# Patient Record
Sex: FEMALE | Race: WHITE | NOT HISPANIC OR LATINO | URBAN - METROPOLITAN AREA
[De-identification: names, ages, dates, MRNs, and addresses within clinical notes are randomized per-mention and may not be internally consistent; named-entity substitution may affect disease eponyms.]

---

## 2017-12-06 ENCOUNTER — OUTPATIENT (OUTPATIENT)
Dept: OUTPATIENT SERVICES | Facility: HOSPITAL | Age: 67
LOS: 1 days | End: 2017-12-06
Payer: COMMERCIAL

## 2017-12-06 ENCOUNTER — APPOINTMENT (OUTPATIENT)
Dept: MAMMOGRAPHY | Facility: CLINIC | Age: 67
End: 2017-12-06
Payer: COMMERCIAL

## 2017-12-06 DIAGNOSIS — Z00.8 ENCOUNTER FOR OTHER GENERAL EXAMINATION: ICD-10-CM

## 2017-12-06 PROCEDURE — G0202: CPT | Mod: 26

## 2017-12-06 PROCEDURE — 77067 SCR MAMMO BI INCL CAD: CPT

## 2017-12-06 PROCEDURE — 77063 BREAST TOMOSYNTHESIS BI: CPT

## 2017-12-06 PROCEDURE — 77063 BREAST TOMOSYNTHESIS BI: CPT | Mod: 26

## 2017-12-20 ENCOUNTER — APPOINTMENT (OUTPATIENT)
Dept: ULTRASOUND IMAGING | Facility: CLINIC | Age: 67
End: 2017-12-20

## 2017-12-20 ENCOUNTER — OUTPATIENT (OUTPATIENT)
Dept: OUTPATIENT SERVICES | Facility: HOSPITAL | Age: 67
LOS: 1 days | End: 2017-12-20
Payer: COMMERCIAL

## 2017-12-20 DIAGNOSIS — Z00.8 ENCOUNTER FOR OTHER GENERAL EXAMINATION: ICD-10-CM

## 2017-12-20 PROCEDURE — 76641 ULTRASOUND BREAST COMPLETE: CPT

## 2017-12-20 PROCEDURE — 76641 ULTRASOUND BREAST COMPLETE: CPT | Mod: 26,50

## 2018-06-20 ENCOUNTER — EMERGENCY (EMERGENCY)
Facility: HOSPITAL | Age: 68
LOS: 0 days | Discharge: HOME | End: 2018-06-20
Attending: EMERGENCY MEDICINE | Admitting: EMERGENCY MEDICINE
Payer: COMMERCIAL

## 2018-06-20 VITALS
HEART RATE: 96 BPM | TEMPERATURE: 98 F | DIASTOLIC BLOOD PRESSURE: 97 MMHG | OXYGEN SATURATION: 100 % | RESPIRATION RATE: 16 BRPM | SYSTOLIC BLOOD PRESSURE: 193 MMHG

## 2018-06-20 VITALS — WEIGHT: 82.89 LBS

## 2018-06-20 DIAGNOSIS — R51 HEADACHE: ICD-10-CM

## 2018-06-20 DIAGNOSIS — M79.605 PAIN IN LEFT LEG: ICD-10-CM

## 2018-06-20 DIAGNOSIS — S81.812A LACERATION WITHOUT FOREIGN BODY, LEFT LOWER LEG, INITIAL ENCOUNTER: ICD-10-CM
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
## 2018-06-20 DIAGNOSIS — S09.90XA UNSPECIFIED INJURY OF HEAD, INITIAL ENCOUNTER: ICD-10-CM
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
## 2018-06-20 DIAGNOSIS — S01.01XA LACERATION WITHOUT FOREIGN BODY OF SCALP, INITIAL ENCOUNTER: ICD-10-CM

## 2018-06-20 PROCEDURE — 70450 CT HEAD/BRAIN W/O DYE: CPT | Mod: 26

## 2018-06-20 PROCEDURE — 99284 EMERGENCY DEPT VISIT MOD MDM: CPT

## 2018-06-20 PROCEDURE — 12004 RPR S/N/AX/GEN/TRK7.6-12.5CM: CPT | Mod: 59,LT

## 2018-06-20 RX ORDER — CEPHALEXIN 500 MG
500 CAPSULE ORAL ONCE
Qty: 0 | Refills: 0 | Status: COMPLETED | OUTPATIENT
Start: 2018-06-20 | End: 2018-06-20

## 2018-06-20 RX ORDER — CEPHALEXIN 500 MG
4 CAPSULE ORAL
Qty: 112 | Refills: 0 | OUTPATIENT
Start: 2018-06-20 | End: 2018-06-26

## 2018-06-20 RX ORDER — ACETAMINOPHEN 500 MG
650 TABLET ORAL ONCE
Qty: 0 | Refills: 0 | Status: COMPLETED | OUTPATIENT
Start: 2018-06-20 | End: 2018-06-20

## 2018-06-20 RX ADMIN — Medication 500 MILLIGRAM(S): at 20:07

## 2018-06-20 RX ADMIN — Medication 650 MILLIGRAM(S): at 18:28

## 2018-06-20 NOTE — ED STATDOCS - PROGRESS NOTE DETAILS
69 yo female with a PMH of throat CA with G tube presents with left lower leg laceration s/p trip and fall against a brick wall. +head injury with area of bleeding. Denies LOC, dizziness, numbness, tingling, fever. -Anil Mckeon PA-C

## 2018-06-20 NOTE — ED PROCEDURE NOTE - PROCEDURE ADDITIONAL DETAILS
On superior aspect of the laceration: skin tear, unable to place stitches. Applied stereostripes. Applied bacitracin, sterile gauze, and kerlix.

## 2018-06-20 NOTE — ED STATDOCS - MEDICAL DECISION MAKING DETAILS
Elderly F ambulatory to ED with L anterior shin flap laceration and frontal scalp small laceration, +HA s/p trip and fall, hit leg and head against brick wall. No LOC. Neuro exam intact. No ASA nor a/c.  Plan: CT head, lac repairs of scalp and leg.

## 2018-06-20 NOTE — ED PROCEDURE NOTE - CPROC ED POST PROC CARE GUIDE1
Instructed patient/caregiver regarding signs and symptoms of infection./Verbal/written post procedure instructions were given to patient/caregiver./Instructed patient/caregiver to follow-up with primary care physician./Keep the cast/splint/dressing clean and dry.
Verbal/written post procedure instructions were given to patient/caregiver./Instructed patient/caregiver to follow-up with primary care physician./Keep the cast/splint/dressing clean and dry./Instructed patient/caregiver regarding signs and symptoms of infection.

## 2018-06-20 NOTE — ED STATDOCS - SECONDARY DIAGNOSIS.
Skin tear of left lower leg without complication, initial encounter Scalp laceration, initial encounter

## 2018-06-20 NOTE — ED STATDOCS - CARE PLAN
Principal Discharge DX:	Laceration of left lower leg, initial encounter  Secondary Diagnosis:	Skin tear of left lower leg without complication, initial encounter  Secondary Diagnosis:	Scalp laceration, initial encounter

## 2018-06-20 NOTE — ED ADULT TRIAGE NOTE - CHIEF COMPLAINT QUOTE
Pt presents to ED c/o "skin tear to my shin after tripping and falling on a brick wall". Pt has bandage applied with bleeding controlled upon arrival to ED. Pt denies blood thinners. Pt reports hitting head. Pt denies LOC. Pt GCS 15.

## 2018-06-20 NOTE — ED STATDOCS - ATTENDING CONTRIBUTION TO CARE
I, John Christensen MD, personally saw the patient with the PA, and completed the key components of the history and physical exam. I then discussed the management plan with the PA.

## 2018-06-20 NOTE — ED STATDOCS - OBJECTIVE STATEMENT
69 y/o female with a PMHx of throat CA (25 years ago) presents to the ED s/p mechanical fall at 4:30pm today. Pt tripped and fell onto a brick wall, striking her head and left shin. Pt now c/o HA, skin tear to left shin, laceration to scalp. No neck pain, weakness, numbness, LOC, nausea, blurry vision, neck pain. No blood thinners. Last tetanus shot within the last 10 years.

## 2019-01-25 PROBLEM — C14.0 MALIGNANT NEOPLASM OF PHARYNX, UNSPECIFIED: Chronic | Status: ACTIVE | Noted: 2018-06-25

## 2019-05-08 ENCOUNTER — APPOINTMENT (OUTPATIENT)
Dept: ULTRASOUND IMAGING | Facility: CLINIC | Age: 69
End: 2019-05-08

## 2019-05-08 ENCOUNTER — APPOINTMENT (OUTPATIENT)
Dept: MAMMOGRAPHY | Facility: CLINIC | Age: 69
End: 2019-05-08

## 2019-08-11 NOTE — ED STATDOCS - SKIN, MLM
No clinical evidence of facial injury. +1cm laceration frontal scalp, trace oozing of blood. Left anterior shin: upside down U flap laceration with mild oozing of blood.
No
